# Patient Record
Sex: MALE | Race: WHITE | NOT HISPANIC OR LATINO
[De-identification: names, ages, dates, MRNs, and addresses within clinical notes are randomized per-mention and may not be internally consistent; named-entity substitution may affect disease eponyms.]

---

## 2023-11-17 ENCOUNTER — APPOINTMENT (OUTPATIENT)
Dept: WOUND CARE | Facility: HOSPITAL | Age: 73
End: 2023-11-17
Payer: MEDICARE

## 2023-11-17 ENCOUNTER — OUTPATIENT (OUTPATIENT)
Dept: OUTPATIENT SERVICES | Facility: HOSPITAL | Age: 73
LOS: 1 days | Discharge: HOME | End: 2023-11-17
Payer: MEDICARE

## 2023-11-17 VITALS
TEMPERATURE: 97.8 F | SYSTOLIC BLOOD PRESSURE: 120 MMHG | DIASTOLIC BLOOD PRESSURE: 79 MMHG | OXYGEN SATURATION: 95 % | RESPIRATION RATE: 20 BRPM | HEART RATE: 76 BPM

## 2023-11-17 VITALS — BODY MASS INDEX: 28.04 KG/M2 | HEIGHT: 68 IN | WEIGHT: 185 LBS

## 2023-11-17 DIAGNOSIS — S41.101A UNSPECIFIED OPEN WOUND OF RIGHT UPPER ARM, INITIAL ENCOUNTER: ICD-10-CM

## 2023-11-17 DIAGNOSIS — Z78.9 OTHER SPECIFIED HEALTH STATUS: ICD-10-CM

## 2023-11-17 DIAGNOSIS — Z82.49 FAMILY HISTORY OF ISCHEMIC HEART DISEASE AND OTHER DISEASES OF THE CIRCULATORY SYSTEM: ICD-10-CM

## 2023-11-17 DIAGNOSIS — G62.9 POLYNEUROPATHY, UNSPECIFIED: ICD-10-CM

## 2023-11-17 DIAGNOSIS — Z93.3 COLOSTOMY STATUS: ICD-10-CM

## 2023-11-17 DIAGNOSIS — Z87.891 PERSONAL HISTORY OF NICOTINE DEPENDENCE: ICD-10-CM

## 2023-11-17 PROBLEM — Z00.00 ENCOUNTER FOR PREVENTIVE HEALTH EXAMINATION: Status: ACTIVE | Noted: 2023-11-17

## 2023-11-17 PROCEDURE — G0463: CPT

## 2023-11-17 PROCEDURE — 99203 OFFICE O/P NEW LOW 30 MIN: CPT

## 2023-11-18 ENCOUNTER — TRANSCRIPTION ENCOUNTER (OUTPATIENT)
Age: 73
End: 2023-11-18

## 2023-11-19 DIAGNOSIS — Z98.890 OTHER SPECIFIED POSTPROCEDURAL STATES: ICD-10-CM

## 2023-11-19 DIAGNOSIS — L02.413 CUTANEOUS ABSCESS OF RIGHT UPPER LIMB: ICD-10-CM

## 2023-11-19 DIAGNOSIS — Z87.891 PERSONAL HISTORY OF NICOTINE DEPENDENCE: ICD-10-CM

## 2023-11-19 DIAGNOSIS — G62.9 POLYNEUROPATHY, UNSPECIFIED: ICD-10-CM

## 2023-11-19 DIAGNOSIS — Z88.8 ALLERGY STATUS TO OTHER DRUGS, MEDICAMENTS AND BIOLOGICAL SUBSTANCES: ICD-10-CM

## 2023-11-19 DIAGNOSIS — Z82.49 FAMILY HISTORY OF ISCHEMIC HEART DISEASE AND OTHER DISEASES OF THE CIRCULATORY SYSTEM: ICD-10-CM

## 2023-11-23 ENCOUNTER — NON-APPOINTMENT (OUTPATIENT)
Age: 73
End: 2023-11-23

## 2023-11-29 ENCOUNTER — OUTPATIENT (OUTPATIENT)
Dept: OUTPATIENT SERVICES | Facility: HOSPITAL | Age: 73
LOS: 1 days | Discharge: HOME | End: 2023-11-29
Payer: MEDICARE

## 2023-11-29 ENCOUNTER — APPOINTMENT (OUTPATIENT)
Dept: WOUND CARE | Facility: HOSPITAL | Age: 73
End: 2023-11-29
Payer: MEDICARE

## 2023-11-29 VITALS
TEMPERATURE: 98 F | RESPIRATION RATE: 20 BRPM | BODY MASS INDEX: 28.04 KG/M2 | OXYGEN SATURATION: 96 % | DIASTOLIC BLOOD PRESSURE: 82 MMHG | HEIGHT: 68 IN | HEART RATE: 71 BPM | SYSTOLIC BLOOD PRESSURE: 143 MMHG | WEIGHT: 185 LBS

## 2023-11-29 DIAGNOSIS — S41.101D UNSPECIFIED OPEN WOUND OF RIGHT UPPER ARM, SUBSEQUENT ENCOUNTER: ICD-10-CM

## 2023-11-29 DIAGNOSIS — L02.413 CUTANEOUS ABSCESS OF RIGHT UPPER LIMB: ICD-10-CM

## 2023-11-29 DIAGNOSIS — Z82.49 FAMILY HISTORY OF ISCHEMIC HEART DISEASE AND OTHER DISEASES OF THE CIRCULATORY SYSTEM: ICD-10-CM

## 2023-11-29 DIAGNOSIS — G62.9 POLYNEUROPATHY, UNSPECIFIED: ICD-10-CM

## 2023-11-29 DIAGNOSIS — Z98.890 OTHER SPECIFIED POSTPROCEDURAL STATES: ICD-10-CM

## 2023-11-29 DIAGNOSIS — Z87.891 PERSONAL HISTORY OF NICOTINE DEPENDENCE: ICD-10-CM

## 2023-11-29 DIAGNOSIS — Z88.8 ALLERGY STATUS TO OTHER DRUGS, MEDICAMENTS AND BIOLOGICAL SUBSTANCES: ICD-10-CM

## 2023-11-29 PROCEDURE — 99213 OFFICE O/P EST LOW 20 MIN: CPT

## 2023-11-29 PROCEDURE — G0463: CPT

## 2023-12-06 ENCOUNTER — OUTPATIENT (OUTPATIENT)
Dept: OUTPATIENT SERVICES | Facility: HOSPITAL | Age: 73
LOS: 1 days | Discharge: HOME | End: 2023-12-06
Payer: MEDICARE

## 2023-12-06 ENCOUNTER — APPOINTMENT (OUTPATIENT)
Dept: WOUND CARE | Facility: HOSPITAL | Age: 73
End: 2023-12-06
Payer: MEDICARE

## 2023-12-06 VITALS
DIASTOLIC BLOOD PRESSURE: 78 MMHG | WEIGHT: 185 LBS | TEMPERATURE: 97.8 F | HEART RATE: 86 BPM | SYSTOLIC BLOOD PRESSURE: 118 MMHG | OXYGEN SATURATION: 95 % | BODY MASS INDEX: 28.04 KG/M2 | RESPIRATION RATE: 18 BRPM | HEIGHT: 68 IN

## 2023-12-06 DIAGNOSIS — L02.413 CUTANEOUS ABSCESS OF RIGHT UPPER LIMB: ICD-10-CM

## 2023-12-06 DIAGNOSIS — S41.101D UNSPECIFIED OPEN WOUND OF RIGHT UPPER ARM, SUBSEQUENT ENCOUNTER: ICD-10-CM

## 2023-12-06 DIAGNOSIS — L72.0 EPIDERMAL CYST: ICD-10-CM

## 2023-12-06 PROCEDURE — G0463: CPT

## 2023-12-06 PROCEDURE — 99203 OFFICE O/P NEW LOW 30 MIN: CPT

## 2023-12-07 DIAGNOSIS — Z98.890 OTHER SPECIFIED POSTPROCEDURAL STATES: ICD-10-CM

## 2023-12-07 DIAGNOSIS — G62.9 POLYNEUROPATHY, UNSPECIFIED: ICD-10-CM

## 2023-12-07 DIAGNOSIS — Z87.891 PERSONAL HISTORY OF NICOTINE DEPENDENCE: ICD-10-CM

## 2023-12-07 DIAGNOSIS — Z88.8 ALLERGY STATUS TO OTHER DRUGS, MEDICAMENTS AND BIOLOGICAL SUBSTANCES: ICD-10-CM

## 2023-12-07 DIAGNOSIS — L02.413 CUTANEOUS ABSCESS OF RIGHT UPPER LIMB: ICD-10-CM

## 2023-12-07 DIAGNOSIS — Z82.49 FAMILY HISTORY OF ISCHEMIC HEART DISEASE AND OTHER DISEASES OF THE CIRCULATORY SYSTEM: ICD-10-CM

## 2023-12-08 PROBLEM — L72.0 CYST OF SKIN AND SUBCUTANEOUS TISSUE: Status: ACTIVE | Noted: 2023-12-08

## 2023-12-20 ENCOUNTER — APPOINTMENT (OUTPATIENT)
Dept: WOUND CARE | Facility: HOSPITAL | Age: 73
End: 2023-12-20
Payer: MEDICARE

## 2023-12-20 ENCOUNTER — OUTPATIENT (OUTPATIENT)
Dept: OUTPATIENT SERVICES | Facility: HOSPITAL | Age: 73
LOS: 1 days | Discharge: HOME | End: 2023-12-20
Payer: MEDICARE

## 2023-12-20 VITALS
BODY MASS INDEX: 28.04 KG/M2 | WEIGHT: 185 LBS | HEART RATE: 66 BPM | OXYGEN SATURATION: 98 % | HEIGHT: 68 IN | SYSTOLIC BLOOD PRESSURE: 122 MMHG | DIASTOLIC BLOOD PRESSURE: 78 MMHG | TEMPERATURE: 97 F | RESPIRATION RATE: 20 BRPM

## 2023-12-20 DIAGNOSIS — S41.101D UNSPECIFIED OPEN WOUND OF RIGHT UPPER ARM, SUBSEQUENT ENCOUNTER: ICD-10-CM

## 2023-12-20 PROCEDURE — 87186 SC STD MICRODIL/AGAR DIL: CPT

## 2023-12-20 PROCEDURE — 99213 OFFICE O/P EST LOW 20 MIN: CPT

## 2023-12-20 PROCEDURE — 87070 CULTURE OTHR SPECIMN AEROBIC: CPT

## 2023-12-20 PROCEDURE — 87077 CULTURE AEROBIC IDENTIFY: CPT

## 2023-12-20 PROCEDURE — 99203 OFFICE O/P NEW LOW 30 MIN: CPT

## 2023-12-20 PROCEDURE — G0463: CPT

## 2023-12-20 NOTE — HISTORY OF PRESENT ILLNESS
[FreeTextEntry1] : 72 yo WM, here for f/u of a chronic right upper arm wound that spontaneously opened wks ago and a 2nd smaller opening developed last wk. Had a CAT scan done of the right upper arm which showed a draining abscess. Swab culture done today with the dsg showing a faint hint of possible green drainage. No SOI.

## 2023-12-20 NOTE — ASSESSMENT
[Verbal] : Verbal [Demo] : Demo [Spouse] : Spouse [Good - alert, interested, motivated] : Good - alert, interested, motivated [Verbalizes knowledge/Understanding] : Verbalizes knowledge/understanding [Dressing changes] : dressing changes [Skin Care] : skin care [Pressure relief] : pressure relief [Signs and symptoms of infection] : sign and symptoms of infection [Nutrition] : nutrition [How and When to Call] : how and when to call [Pain Management] : pain management [Off-loading] : off-loading [Patient responsibility to plan of care] : patient responsibility to plan of care [] : Yes [Stable] : stable [Home] : Home [Ambulatory] : Ambulatory [Not Applicable - Long Term Care/Home Health Agency] : Long Term Care/Home Health Agency: Not Applicable [FreeTextEntry2] : Infection prevention Localized wound care Promote optimal skin integrity  Maintain acceptable level of pain with use of pharmacological and nonpharmacological interventions Offloading / Pressure relief  [FreeTextEntry4] : Follow up for an assessment in one week Culture obtained of the wound and submitted to lab Patient's spouse performs the wound care at home.  No supplies needed at this time.

## 2023-12-20 NOTE — PHYSICAL EXAM
[4 x 4] : 4 x 4  [JVD] : no jugular venous distention  [Normal Thyroid] : the thyroid was normal [Normal Breath Sounds] : Normal breath sounds [Normal Heart Sounds] : normal heart sounds [Normal Rate and Rhythm] : normal rate and rhythm [Abdomen Masses] : No abdominal massess [Abdomen Tenderness] : ~T ~M No abdominal tenderness [Tender] : nontender [Enlarged] : not enlarged [Alert] : alert [Oriented to Person] : oriented to person [Oriented to Place] : oriented to place [Oriented to Time] : oriented to time [Calm] : calm [de-identified] : elderly WM, NAD, alert, Ox3. [de-identified] : Culture swab obtained by MD [FreeTextEntry1] : Right Upper Arm  [FreeTextEntry2] : 0.4 [FreeTextEntry3] : 0.9 [FreeTextEntry4] : 0.7 [de-identified] :  Thick / Serosanguinous (dark green drainage on bandage) [de-identified] : 1.3 cm at 10 - 12 o'clock  [de-identified] : Ruptured cyst [de-identified] : Ag Alginate Rope  [de-identified] : Cleansed with 0.9% Normal Saline  Cloth tape  [FreeTextEntry7] : Right Upper Arm - 9 o'clock to wound #1  [FreeTextEntry8] : 0.3 [FreeTextEntry9] : 0.2 [de-identified] : 0.3 [de-identified] : Thick Serosanguinous [de-identified] : Ag Alginate Rope  [de-identified] : Cleansed with 0.9% Normal Saline  Cloth Tape  [TWNoteComboBox4] : Moderate [TWNoteComboBox5] : Yes [TWNoteComboBox6] : Other [de-identified] : No [de-identified] : Erythema [de-identified] : None [de-identified] : None [de-identified] : 100% [de-identified] : No [de-identified] : 3x Weekly [de-identified] : Primary Dressing [de-identified] : Moderate [de-identified] : No [de-identified] : Other [de-identified] : No [de-identified] : Erythema [de-identified] : None [de-identified] : None [de-identified] : 100% [de-identified] : No [de-identified] : 3x Weekly [de-identified] : Primary Dressing

## 2023-12-21 DIAGNOSIS — S41.101D UNSPECIFIED OPEN WOUND OF RIGHT UPPER ARM, SUBSEQUENT ENCOUNTER: ICD-10-CM

## 2023-12-21 DIAGNOSIS — Z87.891 PERSONAL HISTORY OF NICOTINE DEPENDENCE: ICD-10-CM
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
## 2023-12-21 DIAGNOSIS — Z88.8 ALLERGY STATUS TO OTHER DRUGS, MEDICAMENTS AND BIOLOGICAL SUBSTANCES: ICD-10-CM
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
## 2023-12-21 DIAGNOSIS — G62.9 POLYNEUROPATHY, UNSPECIFIED: ICD-10-CM

## 2023-12-21 DIAGNOSIS — Z98.890 OTHER SPECIFIED POSTPROCEDURAL STATES: ICD-10-CM
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
## 2023-12-21 DIAGNOSIS — Z82.49 FAMILY HISTORY OF ISCHEMIC HEART DISEASE AND OTHER DISEASES OF THE CIRCULATORY SYSTEM: ICD-10-CM

## 2023-12-22 LAB
-  AMPICILLIN/SULBACTAM: SIGNIFICANT CHANGE UP
-  AMPICILLIN/SULBACTAM: SIGNIFICANT CHANGE UP
-  CEFAZOLIN: SIGNIFICANT CHANGE UP
-  CEFAZOLIN: SIGNIFICANT CHANGE UP
-  CLINDAMYCIN: SIGNIFICANT CHANGE UP
-  CLINDAMYCIN: SIGNIFICANT CHANGE UP
-  ERYTHROMYCIN: SIGNIFICANT CHANGE UP
-  ERYTHROMYCIN: SIGNIFICANT CHANGE UP
-  GENTAMICIN: SIGNIFICANT CHANGE UP
-  GENTAMICIN: SIGNIFICANT CHANGE UP
-  OXACILLIN: SIGNIFICANT CHANGE UP
-  OXACILLIN: SIGNIFICANT CHANGE UP
-  RIFAMPIN: SIGNIFICANT CHANGE UP
-  RIFAMPIN: SIGNIFICANT CHANGE UP
-  TETRACYCLINE: SIGNIFICANT CHANGE UP
-  TETRACYCLINE: SIGNIFICANT CHANGE UP
-  TRIMETHOPRIM/SULFAMETHOXAZOLE: SIGNIFICANT CHANGE UP
-  TRIMETHOPRIM/SULFAMETHOXAZOLE: SIGNIFICANT CHANGE UP
-  VANCOMYCIN: SIGNIFICANT CHANGE UP
-  VANCOMYCIN: SIGNIFICANT CHANGE UP
CULTURE RESULTS: ABNORMAL
CULTURE RESULTS: ABNORMAL
METHOD TYPE: SIGNIFICANT CHANGE UP
METHOD TYPE: SIGNIFICANT CHANGE UP
ORGANISM # SPEC MICROSCOPIC CNT: ABNORMAL
ORGANISM # SPEC MICROSCOPIC CNT: ABNORMAL
ORGANISM # SPEC MICROSCOPIC CNT: SIGNIFICANT CHANGE UP
ORGANISM # SPEC MICROSCOPIC CNT: SIGNIFICANT CHANGE UP
SPECIMEN SOURCE: SIGNIFICANT CHANGE UP
SPECIMEN SOURCE: SIGNIFICANT CHANGE UP

## 2023-12-27 ENCOUNTER — OUTPATIENT (OUTPATIENT)
Dept: OUTPATIENT SERVICES | Facility: HOSPITAL | Age: 73
LOS: 1 days | Discharge: HOME | End: 2023-12-27
Payer: MEDICARE

## 2023-12-27 ENCOUNTER — APPOINTMENT (OUTPATIENT)
Dept: WOUND CARE | Facility: HOSPITAL | Age: 73
End: 2023-12-27
Payer: MEDICARE

## 2023-12-27 VITALS
WEIGHT: 185 LBS | SYSTOLIC BLOOD PRESSURE: 150 MMHG | OXYGEN SATURATION: 94 % | DIASTOLIC BLOOD PRESSURE: 74 MMHG | TEMPERATURE: 97.8 F | HEIGHT: 68 IN | RESPIRATION RATE: 20 BRPM | HEART RATE: 70 BPM | BODY MASS INDEX: 28.04 KG/M2

## 2023-12-27 DIAGNOSIS — S41.101D UNSPECIFIED OPEN WOUND OF RIGHT UPPER ARM, SUBSEQUENT ENCOUNTER: ICD-10-CM

## 2023-12-27 PROCEDURE — 99213 OFFICE O/P EST LOW 20 MIN: CPT

## 2023-12-27 PROCEDURE — G0463: CPT

## 2023-12-27 NOTE — PHYSICAL EXAM
[4 x 4] : 4 x 4  [Normal Thyroid] : the thyroid was normal [Normal Breath Sounds] : Normal breath sounds [Normal Heart Sounds] : normal heart sounds [Normal Rate and Rhythm] : normal rate and rhythm [Alert] : alert [Oriented to Person] : oriented to person [Oriented to Place] : oriented to place [Oriented to Time] : oriented to time [Calm] : calm [JVD] : no jugular venous distention  [Abdomen Masses] : No abdominal massess [Abdomen Tenderness] : ~T ~M No abdominal tenderness [Tender] : nontender [Enlarged] : not enlarged [de-identified] : elderly WM, NAD, alert, Ox3. [FreeTextEntry1] : Right Upper Arm  [FreeTextEntry2] : 0.6 [FreeTextEntry3] : 0.7 [FreeTextEntry4] : 0.9 [de-identified] : serosanguineous [de-identified] : 1.3 cm at 10 - 12 o'clock  [de-identified] : Ruptured cyst [de-identified] : Ag Alginate Rope  [de-identified] : Mechanically cleansed with sterile gauze and normal saline 0.9% Dry Dressing Cloth tape. [de-identified] : vikyb [FreeTextEntry7] : Right Upper Arm - 9 o'clock to wound #1  [FreeTextEntry8] : 0.2 [FreeTextEntry9] : 0.1 [de-identified] : 0.1 [de-identified] : small serosanguineous [de-identified] : Ag Alginate Rope  [de-identified] : Cleansed with 0.9% Normal Saline  Cloth Tape  [TWNoteComboBox4] : Moderate [TWNoteComboBox5] : Yes [TWNoteComboBox6] : Other [de-identified] : Erythema [de-identified] : No [de-identified] : None [de-identified] : None [de-identified] : 100% [de-identified] : No [de-identified] : 3x Weekly [de-identified] : Primary Dressing [de-identified] : No [de-identified] : Other [de-identified] : No [de-identified] : Erythema [de-identified] : None [de-identified] : None [de-identified] : 100% [de-identified] : No [de-identified] : 3x Weekly [de-identified] : Primary Dressing

## 2023-12-27 NOTE — ASSESSMENT
[Verbal] : Verbal [Demo] : Demo [Spouse] : Spouse [Good - alert, interested, motivated] : Good - alert, interested, motivated [Verbalizes knowledge/Understanding] : Verbalizes knowledge/understanding [Dressing changes] : dressing changes [Skin Care] : skin care [Pressure relief] : pressure relief [Signs and symptoms of infection] : sign and symptoms of infection [Nutrition] : nutrition [How and When to Call] : how and when to call [Pain Management] : pain management [Off-loading] : off-loading [Patient responsibility to plan of care] : patient responsibility to plan of care [Stable] : stable [Home] : Home [Ambulatory] : Ambulatory [Not Applicable - Long Term Care/Home Health Agency] : Long Term Care/Home Health Agency: Not Applicable [Patient] : Patient [] : No [FreeTextEntry2] : Infection prevention Localized wound care Promote optimal skin integrity  Maintain acceptable level of pain with use of pharmacological and nonpharmacological interventions Offloading / Pressure relief  [FreeTextEntry4] : C&S reviewed by MD Prieto No oral abt therapy at this time Pt's spouse performs own dressing changes, has adequate supply at home. F/U 2 Week

## 2023-12-27 NOTE — HISTORY OF PRESENT ILLNESS
[FreeTextEntry1] : 74 yo WM, here for f/u of a chronic right upper arm wound that spontaneously opened wks ago and a 2nd smaller opening developed 2 wks ago. Had a CAT scan done of the right upper arm which showed a draining abscess. Swab culture done recently showed Staph aur.  No SOI. Both wounds are bear/healing well.
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
## 2023-12-28 DIAGNOSIS — G62.9 POLYNEUROPATHY, UNSPECIFIED: ICD-10-CM
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
## 2023-12-28 DIAGNOSIS — Z88.8 ALLERGY STATUS TO OTHER DRUGS, MEDICAMENTS AND BIOLOGICAL SUBSTANCES: ICD-10-CM

## 2023-12-28 DIAGNOSIS — Z87.891 PERSONAL HISTORY OF NICOTINE DEPENDENCE: ICD-10-CM

## 2023-12-28 DIAGNOSIS — Z98.890 OTHER SPECIFIED POSTPROCEDURAL STATES: ICD-10-CM
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
## 2023-12-28 DIAGNOSIS — Z82.49 FAMILY HISTORY OF ISCHEMIC HEART DISEASE AND OTHER DISEASES OF THE CIRCULATORY SYSTEM: ICD-10-CM

## 2023-12-28 DIAGNOSIS — S41.101D UNSPECIFIED OPEN WOUND OF RIGHT UPPER ARM, SUBSEQUENT ENCOUNTER: ICD-10-CM

## 2024-01-03 ENCOUNTER — OUTPATIENT (OUTPATIENT)
Dept: OUTPATIENT SERVICES | Facility: HOSPITAL | Age: 74
LOS: 1 days | Discharge: HOME | End: 2024-01-03
Payer: MEDICARE

## 2024-01-03 ENCOUNTER — APPOINTMENT (OUTPATIENT)
Dept: WOUND CARE | Facility: HOSPITAL | Age: 74
End: 2024-01-03
Payer: MEDICARE

## 2024-01-03 VITALS
OXYGEN SATURATION: 96 % | DIASTOLIC BLOOD PRESSURE: 81 MMHG | TEMPERATURE: 97.7 F | WEIGHT: 185 LBS | BODY MASS INDEX: 28.04 KG/M2 | HEIGHT: 68 IN | SYSTOLIC BLOOD PRESSURE: 132 MMHG | RESPIRATION RATE: 20 BRPM | HEART RATE: 70 BPM

## 2024-01-03 DIAGNOSIS — S41.101D UNSPECIFIED OPEN WOUND OF RIGHT UPPER ARM, SUBSEQUENT ENCOUNTER: ICD-10-CM
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
## 2024-01-03 DIAGNOSIS — Z87.891 PERSONAL HISTORY OF NICOTINE DEPENDENCE: ICD-10-CM
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
## 2024-01-03 DIAGNOSIS — G62.9 POLYNEUROPATHY, UNSPECIFIED: ICD-10-CM

## 2024-01-03 DIAGNOSIS — Z98.890 OTHER SPECIFIED POSTPROCEDURAL STATES: ICD-10-CM

## 2024-01-03 DIAGNOSIS — Z86.19 PERSONAL HISTORY OF OTHER INFECTIOUS AND PARASITIC DISEASES: ICD-10-CM

## 2024-01-03 DIAGNOSIS — Z88.8 ALLERGY STATUS TO OTHER DRUGS, MEDICAMENTS AND BIOLOGICAL SUBSTANCES: ICD-10-CM
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
## 2024-01-03 DIAGNOSIS — Z82.49 FAMILY HISTORY OF ISCHEMIC HEART DISEASE AND OTHER DISEASES OF THE CIRCULATORY SYSTEM: ICD-10-CM
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
## 2024-01-03 PROCEDURE — 99213 OFFICE O/P EST LOW 20 MIN: CPT

## 2024-01-03 PROCEDURE — G0463: CPT

## 2024-01-03 NOTE — PHYSICAL EXAM
[Normal Thyroid] : the thyroid was normal [Normal Breath Sounds] : Normal breath sounds [Normal Heart Sounds] : normal heart sounds [Normal Rate and Rhythm] : normal rate and rhythm [Alert] : alert [Oriented to Person] : oriented to person [Oriented to Place] : oriented to place [Oriented to Time] : oriented to time [Calm] : calm [2 x 2] : 2 x 2  [JVD] : no jugular venous distention  [Abdomen Masses] : No abdominal massess [Abdomen Tenderness] : ~T ~M No abdominal tenderness [Tender] : nontender [Enlarged] : not enlarged [de-identified] : elderly WM, NAD, alert, Ox3. [FreeTextEntry1] : Upper Arm  [FreeTextEntry2] : 0.3 [FreeTextEntry3] : 0.3 [FreeTextEntry4] : 0.2 [de-identified] : Sanguinous  [de-identified] : Ruptured cyst  [de-identified] : Calcium Alginate  [de-identified] : Cleansed with 0.9% Normal Saline  Cloth Tape  [de-identified] : vikyb [FreeTextEntry7] : Upper Arm - 9 o'clock to wound #1 - CLOSED  [de-identified] : Cleansed with 0.9% Normal Saline   [TWNoteComboBox1] : Right [TWNoteComboBox4] : Small [TWNoteComboBox5] : No [TWNoteComboBox6] : Other [de-identified] : No [de-identified] : Erythema [de-identified] : None [de-identified] : None [de-identified] : 100% [de-identified] : No [de-identified] : 3x Weekly [de-identified] : Primary Dressing [TWNoteComboBox9] : Right

## 2024-01-03 NOTE — ASSESSMENT
[Verbal] : Verbal [Demo] : Demo [Patient] : Patient [Good - alert, interested, motivated] : Good - alert, interested, motivated [Verbalizes knowledge/Understanding] : Verbalizes knowledge/understanding [Dressing changes] : dressing changes [Skin Care] : skin care [Pressure relief] : pressure relief [Signs and symptoms of infection] : sign and symptoms of infection [Nutrition] : nutrition [How and When to Call] : how and when to call [Pain Management] : pain management [Off-loading] : off-loading [Patient responsibility to plan of care] : patient responsibility to plan of care [] : Yes [Stable] : stable [Home] : Home [Ambulatory] : Ambulatory [Not Applicable - Long Term Care/Home Health Agency] : Long Term Care/Home Health Agency: Not Applicable [FreeTextEntry2] : Infection prevention Localized wound care Promote optimal skin integrity  Maintain acceptable level of pain with use of pharmacological and nonpharmacological interventions  [FreeTextEntry4] : Follow up for an assessment in two weeks The patient's wife is able to perform wound care at home.  Patient was provided with supplies to prevent delay of care.  No additional supplies needed at this time.

## 2024-01-03 NOTE — HISTORY OF PRESENT ILLNESS
[FreeTextEntry1] : 74 yo WM, here for f/u of a chronic right upper arm wound that spontaneously opened wks ago and a 2nd smaller opening developed several wks ago. Had a CAT scan done of the right upper arm which showed a draining abscess. Swab culture done recently showed Staph aur. No SOI. Both wounds are bear/healing well.

## 2024-01-17 ENCOUNTER — OUTPATIENT (OUTPATIENT)
Dept: OUTPATIENT SERVICES | Facility: HOSPITAL | Age: 74
LOS: 1 days | Discharge: HOME | End: 2024-01-17
Payer: MEDICARE

## 2024-01-17 ENCOUNTER — APPOINTMENT (OUTPATIENT)
Dept: WOUND CARE | Facility: HOSPITAL | Age: 74
End: 2024-01-17
Payer: MEDICARE

## 2024-01-17 VITALS
DIASTOLIC BLOOD PRESSURE: 68 MMHG | TEMPERATURE: 98.2 F | RESPIRATION RATE: 16 BRPM | OXYGEN SATURATION: 93 % | WEIGHT: 185 LBS | BODY MASS INDEX: 28.04 KG/M2 | SYSTOLIC BLOOD PRESSURE: 125 MMHG | HEART RATE: 61 BPM | HEIGHT: 68 IN

## 2024-01-17 DIAGNOSIS — S41.101A UNSPECIFIED OPEN WOUND OF RIGHT UPPER ARM, INITIAL ENCOUNTER: ICD-10-CM

## 2024-01-17 PROCEDURE — G0463: CPT

## 2024-01-17 PROCEDURE — 99213 OFFICE O/P EST LOW 20 MIN: CPT

## 2024-01-18 DIAGNOSIS — Z86.19 PERSONAL HISTORY OF OTHER INFECTIOUS AND PARASITIC DISEASES: ICD-10-CM

## 2024-01-18 DIAGNOSIS — Z88.8 ALLERGY STATUS TO OTHER DRUGS, MEDICAMENTS AND BIOLOGICAL SUBSTANCES: ICD-10-CM

## 2024-01-18 DIAGNOSIS — Z87.891 PERSONAL HISTORY OF NICOTINE DEPENDENCE: ICD-10-CM
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
## 2024-01-18 DIAGNOSIS — Z98.890 OTHER SPECIFIED POSTPROCEDURAL STATES: ICD-10-CM

## 2024-01-18 DIAGNOSIS — Z82.49 FAMILY HISTORY OF ISCHEMIC HEART DISEASE AND OTHER DISEASES OF THE CIRCULATORY SYSTEM: ICD-10-CM
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
## 2024-01-18 DIAGNOSIS — S41.101D UNSPECIFIED OPEN WOUND OF RIGHT UPPER ARM, SUBSEQUENT ENCOUNTER: ICD-10-CM
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
## 2024-01-18 DIAGNOSIS — G62.9 POLYNEUROPATHY, UNSPECIFIED: ICD-10-CM

## 2024-01-18 NOTE — ASSESSMENT
[Verbal] : Verbal [Demo] : Demo [Patient] : Patient [Spouse] : Spouse [Good - alert, interested, motivated] : Good - alert, interested, motivated [Verbalizes knowledge/Understanding] : Verbalizes knowledge/understanding [Skin Care] : skin care [Pressure relief] : pressure relief [Signs and symptoms of infection] : sign and symptoms of infection [How and When to Call] : how and when to call [Off-loading] : off-loading [Patient responsibility to plan of care] : patient responsibility to plan of care [Stable] : stable [Home] : Home [Ambulatory] : Ambulatory [Not Applicable - Long Term Care/Home Health Agency] : Long Term Care/Home Health Agency: Not Applicable [] : No [FreeTextEntry2] : Infection prevention Localized wound care Maintain optimal skin integrity  Maintain acceptable level of pain with use of pharmacological and nonpharmacological interventions  [FreeTextEntry4] : Follow up for an assessment in one month  No photo was obtained due to IT error  No supplies needed at this time.

## 2024-01-18 NOTE — PHYSICAL EXAM
[Normal Thyroid] : the thyroid was normal [Normal Breath Sounds] : Normal breath sounds [Normal Heart Sounds] : normal heart sounds [Normal Rate and Rhythm] : normal rate and rhythm [Alert] : alert [Oriented to Person] : oriented to person [Oriented to Place] : oriented to place [Oriented to Time] : oriented to time [Calm] : calm [JVD] : no jugular venous distention  [Abdomen Masses] : No abdominal massess [Abdomen Tenderness] : ~T ~M No abdominal tenderness [Tender] : nontender [Enlarged] : not enlarged [de-identified] : elderly WM, NAd, alert, Ox3 [FreeTextEntry1] : Upper Arm - CLOSED  [TWNoteComboBox1] : Right [TWNoteComboBox4] : None

## 2024-01-18 NOTE — HISTORY OF PRESENT ILLNESS
[FreeTextEntry1] : 74 yo WM, here for f/u of a chronic right upper arm wound that spontaneously opened wks ago and a 2nd smaller opening developed several wks ago. Had a CAT scan done of the right upper arm which showed a draining abscess. Swab culture done recently showed Staph aur. No SOI. Both wounds have now healed.

## 2024-02-16 ENCOUNTER — OUTPATIENT (OUTPATIENT)
Dept: OUTPATIENT SERVICES | Facility: HOSPITAL | Age: 74
LOS: 1 days | Discharge: HOME | End: 2024-02-16
Payer: MEDICARE

## 2024-02-16 ENCOUNTER — APPOINTMENT (OUTPATIENT)
Dept: WOUND CARE | Facility: HOSPITAL | Age: 74
End: 2024-02-16
Payer: MEDICARE

## 2024-02-16 VITALS
SYSTOLIC BLOOD PRESSURE: 127 MMHG | RESPIRATION RATE: 14 BRPM | DIASTOLIC BLOOD PRESSURE: 85 MMHG | HEIGHT: 68 IN | WEIGHT: 185 LBS | HEART RATE: 69 BPM | BODY MASS INDEX: 28.04 KG/M2 | TEMPERATURE: 98.4 F | OXYGEN SATURATION: 90 %
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
## 2024-02-16 DIAGNOSIS — Z87.891 PERSONAL HISTORY OF NICOTINE DEPENDENCE: ICD-10-CM
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
## 2024-02-16 DIAGNOSIS — S41.101D UNSPECIFIED OPEN WOUND OF RIGHT UPPER ARM, SUBSEQUENT ENCOUNTER: ICD-10-CM

## 2024-02-16 DIAGNOSIS — G62.9 POLYNEUROPATHY, UNSPECIFIED: ICD-10-CM

## 2024-02-16 DIAGNOSIS — Z82.49 FAMILY HISTORY OF ISCHEMIC HEART DISEASE AND OTHER DISEASES OF THE CIRCULATORY SYSTEM: ICD-10-CM

## 2024-02-16 DIAGNOSIS — Z86.19 PERSONAL HISTORY OF OTHER INFECTIOUS AND PARASITIC DISEASES: ICD-10-CM
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
## 2024-02-16 DIAGNOSIS — Z88.8 ALLERGY STATUS TO OTHER DRUGS, MEDICAMENTS AND BIOLOGICAL SUBSTANCES: ICD-10-CM

## 2024-02-16 DIAGNOSIS — Z98.890 OTHER SPECIFIED POSTPROCEDURAL STATES: ICD-10-CM

## 2024-02-16 PROCEDURE — G0463: CPT

## 2024-02-16 PROCEDURE — 99213 OFFICE O/P EST LOW 20 MIN: CPT

## 2024-02-16 NOTE — ASSESSMENT
[Verbal] : Verbal [Demo] : Demo [Patient] : Patient [Spouse] : Spouse [Good - alert, interested, motivated] : Good - alert, interested, motivated [Verbalizes knowledge/Understanding] : Verbalizes knowledge/understanding [Skin Care] : skin care [Pressure relief] : pressure relief [Signs and symptoms of infection] : sign and symptoms of infection [How and When to Call] : how and when to call [Off-loading] : off-loading [Patient responsibility to plan of care] : patient responsibility to plan of care [Stable] : stable [Home] : Home [Ambulatory] : Ambulatory [Not Applicable - Long Term Care/Home Health Agency] : Long Term Care/Home Health Agency: Not Applicable [] : Yes [FreeTextEntry2] : Infection prevention Localized wound care Maintain optimal skin integrity  Maintain acceptable level of pain with use of pharmacological and nonpharmacological interventions  [FreeTextEntry4] : Patient does not need to follow up unless needed

## 2024-02-16 NOTE — PHYSICAL EXAM
[JVD] : no jugular venous distention  [Normal Thyroid] : the thyroid was normal [Normal Breath Sounds] : Normal breath sounds [Normal Heart Sounds] : normal heart sounds [Normal Rate and Rhythm] : normal rate and rhythm [Abdomen Masses] : No abdominal massess [Abdomen Tenderness] : ~T ~M No abdominal tenderness [Tender] : nontender [Enlarged] : not enlarged [Alert] : alert [Oriented to Person] : oriented to person [Oriented to Place] : oriented to place [Oriented to Time] : oriented to time [Calm] : calm [de-identified] : elderly WM, NAD, alert, Ox3. [FreeTextEntry1] : Upper Arm - CLOSED  [TWNoteComboBox1] : Right [TWNoteComboBox4] : None